# Patient Record
Sex: FEMALE | Race: WHITE | NOT HISPANIC OR LATINO | Employment: FULL TIME | URBAN - METROPOLITAN AREA
[De-identification: names, ages, dates, MRNs, and addresses within clinical notes are randomized per-mention and may not be internally consistent; named-entity substitution may affect disease eponyms.]

---

## 2019-07-02 ENCOUNTER — OFFICE VISIT (OUTPATIENT)
Dept: URGENT CARE | Facility: URGENT CARE | Age: 54
End: 2019-07-02
Payer: COMMERCIAL

## 2019-07-02 VITALS
RESPIRATION RATE: 20 BRPM | WEIGHT: 100 LBS | HEART RATE: 96 BPM | BODY MASS INDEX: 19.63 KG/M2 | OXYGEN SATURATION: 100 % | SYSTOLIC BLOOD PRESSURE: 162 MMHG | HEIGHT: 60 IN | DIASTOLIC BLOOD PRESSURE: 80 MMHG

## 2019-07-02 DIAGNOSIS — M54.50 ACUTE RIGHT-SIDED LOW BACK PAIN WITHOUT SCIATICA: Primary | ICD-10-CM

## 2019-07-02 PROCEDURE — 99203 OFFICE O/P NEW LOW 30 MIN: CPT | Performed by: FAMILY MEDICINE

## 2019-07-02 RX ORDER — METHOCARBAMOL 750 MG/1
750 TABLET, FILM COATED ORAL 4 TIMES DAILY PRN
Qty: 28 TABLET | Refills: 0 | Status: SHIPPED | OUTPATIENT
Start: 2019-07-02 | End: 2019-07-09

## 2019-07-02 RX ORDER — HYDROCODONE BITARTRATE AND ACETAMINOPHEN 5; 325 MG/1; MG/1
1 TABLET ORAL EVERY 6 HOURS PRN
Qty: 8 TABLET | Refills: 0 | Status: SHIPPED | OUTPATIENT
Start: 2019-07-02 | End: 2019-07-04

## 2019-07-02 RX ORDER — NAPROXEN 500 MG/1
500 TABLET ORAL 2 TIMES DAILY WITH MEALS
Qty: 14 TABLET | Refills: 0 | Status: SHIPPED | OUTPATIENT
Start: 2019-07-02 | End: 2019-07-09

## 2019-07-02 ASSESSMENT — MIFFLIN-ST. JEOR: SCORE: 980.1

## 2019-07-02 NOTE — LETTER
[place] [facility]  [address] [address]  [phone]      July 2, 2019    RE:  Sadie Ricci                                                                                                                                                       [address] [facility] [address]            To whom it may concern:    Sadie Ricci is under my professional care for Medical.   She  may return to work with the following on 7-4/5-19: Light duty-unable to lift more than 10 pounds rest of week.          Sincerely,        Jose Rafael Weston    [place] [facility]

## 2019-07-02 NOTE — PROGRESS NOTES
SUBJECTIVE:  Chief Complaint   Patient presents with     Back Pain     Pt states she was moving furniture yesterday afternoon and hurt her back. Lower R back is in the most pain   .yuliat presents with a chief complaint of right back.  The injury occurred 1 day ago.   The injury happened while at home.   How: lifting immediate pain  The patient complained of moderate pain and has had decreased ROM.    Pain exacerbated by movement    He treated it initially with no therapy.   This is the first time this type of injury has occurred to this patient.     No past medical history on file.  Allergies   Allergen Reactions     Wellbutrin [Bupropion] Hives     Social History     Tobacco Use     Smoking status: Current Every Day Smoker     Packs/day: 1.00     Smokeless tobacco: Never Used   Substance Use Topics     Alcohol use: Yes       ROSINTEGUMENTARY/SKIN: NEGATIVE for open wound/bleeding and NEGATIVE for bruising    EXAM: /80 (BP Location: Left arm, Patient Position: Sitting, Cuff Size: Adult Regular)   Pulse 96   Resp 20   Ht 1.524 m (5')   Wt 45.4 kg (100 lb)   SpO2 100%   BMI 19.53 kg/m  Gen: healthy,alert,no distress  Extremity: back has pain with palpation and rom.   There is not compromise to the distal circulation.  Pulses are +2 and CRT is brisk.GENERAL APPEARANCE: healthy, alert and no distress  EXTREMITIES: peripheral pulses normal  SKIN: no suspicious lesions or rashes  NEURO: Normal strength and tone, sensory exam grossly normal, mentation intact and speech normal  Negative SLR    X-RAY was not done.      ICD-10-CM    1. Acute right-sided low back pain without sciatica M54.5 methocarbamol (ROBAXIN) 750 MG tablet     naproxen (NAPROSYN) 500 MG tablet     HYDROcodone-acetaminophen (NORCO) 5-325 MG tablet     RICE

## 2020-01-01 ENCOUNTER — VIRTUAL VISIT (OUTPATIENT)
Dept: FAMILY MEDICINE | Facility: OTHER | Age: 55
End: 2020-01-01

## 2020-01-01 NOTE — PROGRESS NOTES
"Date: 2020 08:30:11  Clinician: Asha Song  Clinician NPI: [id number]  Patient: margaret ayers  Patient : 1965  Patient Address: [address] [place] [address]  Patient Phone: [phone]  Visit Protocol: UTI  Patient Summary:  margaret is a 54 year old ( : 1965 ) female who initiated a Visit for a presumed bladder infection. When asked the question \"Please sign me up to receive news, health information and promotions from [employer].\", margaret responded \"No\".   Her symptoms started 1-3 days ago and consist of dysuria, urinary frequency, and urgency.   Symptom details   Urine color: The color of her urine is yellow.    Denied symptoms include foul-smelling urine, nausea, feeling as if the bladder is never empty, flank pain, abdominal pain, chills, vaginal discharge, urinary incontinence, vomiting, and vaginal itching. She does not feel feverish.   Over-the-counter medications or home remedies used to relieve the current symptoms as reported by the patient (free text): Cranberry juice, drinking water and heating pad.   Precipitating events  margaret denies having a sexually transmitted disease.  Pertinent medical history  margaret has had a bladder infection before and has had 1 in the past 12 months. Her most recent bladder infection was not within the last 4 weeks. Her current symptoms are similar to her previous bladder infection symptoms.   Nitrofurantoin (Macrobid) has been effective in treating her past bladder infections.   She has experienced problems or side effects with the following antibiotics in the past: nitrofurantoin (Macrobid).  Problems or side effects as reported by the patient (free text): Stomach upset   margaret has not been prescribed antibiotics to prevent frequent or repeated bladder infections in the past and does not get yeast infections when she takes antibiotics.   margaret does not have a history of kidney stones. She has not used a catheter or been a patient in a hospital " or nursing home in the past 2 weeks.   margaret smokes or uses smokeless tobacco.     MEDICATIONS: No current medications, ALLERGIES: NKDA  Clinician Response:  Dear mragaret,  Based on the information you have provided, you likely have an acute urinary tract infection, also called a bladder infection. Bladder infections occur when bacteria from the outside of the body enters the urinary tract. Any part of the urinary system can be infected, but the bladder is the most common.  Medication information  I am prescribing:     Sulfamethoxazole-trimethoprim (Bactrim DS) 800-160 mg oral tablet. Take 1 tablet by mouth every 12 hours for 3 days. There are no refills with this prescription.   Certain antibiotics such as Bactrim and Ciprofloxacin can cause your skin to be more sensitive to the sun. Exposure to the sun when taking these antibiotics may cause skin rash, itching, redness, or a severe sunburn. Be sure to avoid prolonged or direct exposure to the sun, especially between 10 am and 3 pm, if possible. Wear protective clothing and use sunscreen of SPF 30 or higher when you are outdoors.  The medication I prescribed for your bladder infection is an antibiotic. Continue taking the medication until it is gone even if you feel better. If you get an upset stomach while taking antibiotics, taking the medication with food can help.   Yeast infections can be a common side effect of antibiotics. The most common symptom of a yeast infection is itchiness in and around the vagina. Other signs and symptoms include burning, redness, or a thick, white vaginal discharge that looks like cottage cheese and does not have a bad smell.  Self care  Urination helps to flush bacteria from the urinary tract. For this reason, drinking water and urinating often helps relieve some urinary symptoms and can decrease your risk of getting bladder infections in the future.  Other steps you can take to prevent future bladder infections include:     Wipe front  to back after using the bathroom    Urinate after sexual intercourse    Avoid using deodorant sprays, douches, or powders in the vaginal area     Becoming tobacco-free is one of the best things you can do to improve your health. For help with quitting tobacco, you can call 3-975-QUIT-NOW (1-240.579.2262) or visit smokefree.gov.  When to seek care  Please make an appointment to be seen in a clinic or urgent care if any of the following occur:     You develop new symptoms or your symptoms become worse    You have medication side effects that make it difficult to take them as prescribed    Your symptoms do not improve within 1-2 days of starting treatment    You have symptoms of a bladder infection that return shortly after completing treatment     It is possible to have an allergic reaction to an antibiotic even if you have not had one in the past. If you notice a new rash, significant swelling, or difficulty breathing, stop taking this medication immediately and go to a clinic or urgent care.   Diagnosis: Acute uncomplicated bladder infection  Diagnosis ICD: N39.0  Prescription: sulfamethoxazole-trimethoprim (Bactrim DS) 800-160 mg oral tablet 6 tablet, 3 days supply. Take 1 tablet by mouth every 12 hours for 3 days. Refills: 0, Refill as needed: no, Allow substitutions: yes  Pharmacy: [facility] #[id number] - [phone] - [address] [place] [address]

## 2020-04-28 ENCOUNTER — VIRTUAL VISIT (OUTPATIENT)
Dept: FAMILY MEDICINE | Facility: OTHER | Age: 55
End: 2020-04-28

## 2020-04-28 NOTE — PROGRESS NOTES
"Date: 2020 13:39:18  Clinician: Lani Clifford  Clinician NPI: [id number]  Patient: margaret ayers  Patient : 1965  Patient Address: [address] [place] [address]  Patient Phone: [phone]  Visit Protocol: UTI  Patient Summary:  margaret is a 54 year old ( : 1965 ) female who initiated a Visit for a presumed bladder infection. When asked the question \"Please sign me up to receive news, health information and promotions from [employer].\", margaret responded \"No\".   Her symptoms started 1-3 days ago and consist of dysuria, feeling as if the bladder is never empty, urinary frequency, and urgency.   Symptom details   Urine color: The color of her urine is yellow.    Denied symptoms include foul-smelling urine, nausea, flank pain, abdominal pain, chills, vaginal discharge, urinary incontinence, vomiting, and vaginal itching. She does not feel feverish.   Over-the-counter medications or home remedies used to relieve the current symptoms as reported by the patient (free text): cranberry juice, lots of water   Precipitating events  margaret denies having a sexually transmitted disease.  Pertinent medical history  margaret has had a bladder infection before and has had 1 in the past 12 months. Her most recent bladder infection was not within the last 4 weeks. Her current symptoms are similar to her previous bladder infection symptoms.   Sulfamethoxazole-trimethoprim (Bactrim DS) has been effective in treating her past bladder infections.   She has experienced problems or side effects with the following antibiotics in the past: nitrofurantoin (Macrobid).  Problems or side effects as reported by the patient (free text): stomach upset   margaret has not been prescribed antibiotics to prevent frequent or repeated bladder infections in the past and does not get yeast infections when she takes antibiotics.   margaret does not have a history of kidney stones. She has not used a catheter or been a patient in a hospital " or nursing home in the past 2 weeks.   margaret smokes or uses smokeless tobacco.     MEDICATIONS: No current medications, ALLERGIES: Wellbutrin  Clinician Response:  Dear margaret,    I agree, you probably do have a simple bladder infection. It's been long enough since your last round of antibiotics that we can use bactrim again for you, particularly since macrobid makes you nauseous. I'll send a prescription in for 3 days of bactrim. If you develop fever, new pain in your mid-back, and severe nausea/vomiting, those are signs that you might have a more serious infection and require different antibiotics. If your symptoms don't improve, or you develop those symptoms I just listed, please call back at [phone] to schedule an [facility] or virtual Urgent Care visit for further evaluation.    Diagnosis: Acute cystitis without hematuria  Diagnosis ICD: N30.00  Prescription: sulfamethoxazole-trimethoprim (Bactrim DS) 800-160 mg oral tablet 6 tablet, 3 days supply. Take 1 tablet by mouth every 12 hours for 3 days. Refills: 0, Refill as needed: no, Allow substitutions: yes  [id number] - [phone] - [address] [place] [address]  Addendum created: April 28 14:03:36, 2020 created by: Jose Rafael Weston body: I reviewed the e-visit and discussed the patient with the resident and agree with the resident's assessment and plan of care as documented.

## 2021-11-16 ENCOUNTER — HOSPITAL ENCOUNTER (EMERGENCY)
Facility: CLINIC | Age: 56
Discharge: HOME | End: 2021-11-16
Attending: EMERGENCY MEDICINE | Admitting: EMERGENCY MEDICINE
Payer: COMMERCIAL

## 2021-11-16 ENCOUNTER — APPOINTMENT (OUTPATIENT)
Dept: GENERAL RADIOLOGY | Facility: CLINIC | Age: 56
End: 2021-11-16
Attending: EMERGENCY MEDICINE
Payer: COMMERCIAL

## 2021-11-16 VITALS
RESPIRATION RATE: 24 BRPM | HEART RATE: 91 BPM | WEIGHT: 100 LBS | HEIGHT: 61 IN | BODY MASS INDEX: 18.88 KG/M2 | TEMPERATURE: 98.6 F | DIASTOLIC BLOOD PRESSURE: 89 MMHG | SYSTOLIC BLOOD PRESSURE: 152 MMHG | OXYGEN SATURATION: 96 %

## 2021-11-16 DIAGNOSIS — R07.9 CHEST PAIN, UNSPECIFIED TYPE: ICD-10-CM

## 2021-11-16 LAB
ANION GAP SERPL CALCULATED.3IONS-SCNC: 11 MMOL/L (ref 3–14)
BASOPHILS # BLD AUTO: 0 10E3/UL (ref 0–0.2)
BASOPHILS NFR BLD AUTO: 0 %
BUN SERPL-MCNC: 12 MG/DL (ref 7–30)
CALCIUM SERPL-MCNC: 9.3 MG/DL (ref 8.5–10.1)
CHLORIDE BLD-SCNC: 105 MMOL/L (ref 94–109)
CO2 SERPL-SCNC: 23 MMOL/L (ref 20–32)
CREAT SERPL-MCNC: 0.67 MG/DL (ref 0.52–1.04)
D DIMER PPP FEU-MCNC: 0.56 UG/ML FEU (ref 0–0.5)
EOSINOPHIL # BLD AUTO: 0.1 10E3/UL (ref 0–0.7)
EOSINOPHIL NFR BLD AUTO: 0 %
ERYTHROCYTE [DISTWIDTH] IN BLOOD BY AUTOMATED COUNT: 12.6 % (ref 10–15)
GFR SERPL CREATININE-BSD FRML MDRD: >90 ML/MIN/1.73M2
GLUCOSE BLD-MCNC: 151 MG/DL (ref 70–99)
HCT VFR BLD AUTO: 49.5 % (ref 35–47)
HGB BLD-MCNC: 16.4 G/DL (ref 11.7–15.7)
IMM GRANULOCYTES # BLD: 0.1 10E3/UL
IMM GRANULOCYTES NFR BLD: 1 %
LYMPHOCYTES # BLD AUTO: 1.4 10E3/UL (ref 0.8–5.3)
LYMPHOCYTES NFR BLD AUTO: 9 %
MCH RBC QN AUTO: 30.5 PG (ref 26.5–33)
MCHC RBC AUTO-ENTMCNC: 33.1 G/DL (ref 31.5–36.5)
MCV RBC AUTO: 92 FL (ref 78–100)
MONOCYTES # BLD AUTO: 0.8 10E3/UL (ref 0–1.3)
MONOCYTES NFR BLD AUTO: 5 %
NEUTROPHILS # BLD AUTO: 13.5 10E3/UL (ref 1.6–8.3)
NEUTROPHILS NFR BLD AUTO: 85 %
NRBC # BLD AUTO: 0 10E3/UL
NRBC BLD AUTO-RTO: 0 /100
PLATELET # BLD AUTO: 245 10E3/UL (ref 150–450)
POTASSIUM BLD-SCNC: 3.1 MMOL/L (ref 3.4–5.3)
RBC # BLD AUTO: 5.38 10E6/UL (ref 3.8–5.2)
SODIUM SERPL-SCNC: 139 MMOL/L (ref 133–144)
TROPONIN I SERPL-MCNC: <0.015 UG/L (ref 0–0.04)
TROPONIN I SERPL-MCNC: <0.015 UG/L (ref 0–0.04)
WBC # BLD AUTO: 15.9 10E3/UL (ref 4–11)

## 2021-11-16 PROCEDURE — 93005 ELECTROCARDIOGRAM TRACING: CPT

## 2021-11-16 PROCEDURE — 85025 COMPLETE CBC W/AUTO DIFF WBC: CPT | Performed by: EMERGENCY MEDICINE

## 2021-11-16 PROCEDURE — 84484 ASSAY OF TROPONIN QUANT: CPT | Performed by: EMERGENCY MEDICINE

## 2021-11-16 PROCEDURE — 71046 X-RAY EXAM CHEST 2 VIEWS: CPT

## 2021-11-16 PROCEDURE — 80048 BASIC METABOLIC PNL TOTAL CA: CPT | Performed by: EMERGENCY MEDICINE

## 2021-11-16 PROCEDURE — 85379 FIBRIN DEGRADATION QUANT: CPT | Performed by: EMERGENCY MEDICINE

## 2021-11-16 PROCEDURE — 36415 COLL VENOUS BLD VENIPUNCTURE: CPT | Performed by: EMERGENCY MEDICINE

## 2021-11-16 PROCEDURE — 250N000013 HC RX MED GY IP 250 OP 250 PS 637: Performed by: EMERGENCY MEDICINE

## 2021-11-16 PROCEDURE — 99285 EMERGENCY DEPT VISIT HI MDM: CPT | Mod: 25

## 2021-11-16 RX ORDER — LORAZEPAM 0.5 MG/1
1 TABLET ORAL ONCE
Status: COMPLETED | OUTPATIENT
Start: 2021-11-16 | End: 2021-11-16

## 2021-11-16 RX ADMIN — LORAZEPAM 1 MG: 0.5 TABLET ORAL at 08:51

## 2021-11-16 ASSESSMENT — MIFFLIN-ST. JEOR: SCORE: 978.04

## 2021-11-16 ASSESSMENT — ENCOUNTER SYMPTOMS
SHORTNESS OF BREATH: 1
PALPITATIONS: 1

## 2021-11-16 NOTE — ED PROVIDER NOTES
"  History   Chief Complaint:  Chest Pain and Anxiety       HPI   Sadie Ricci is a 56 year old female with history of low blood pressure who presents with chest pain and palpitations. The patient reports that she has had some recent stress. Yesterday she says she started feeling a pressure-like chest pain and like her heart was beating out of her chest. She says she is nervous about these symptoms, and this anxiety makes it worse. She also endorses some mild shortness of breath.     Review of Systems   Respiratory: Positive for shortness of breath.    Cardiovascular: Positive for chest pain and palpitations.   All other systems reviewed and are negative.        Allergies:  Wellbutrin    Medications:  The patient is not currently taking any medications.    Past Medical History:     Primary osteoarthritis of both first carpometacarpal joints      Past Surgical History:    The patient denies past surgical history.      Family History:    Diabetes - father, mother  Breast cancer - mother  Hyperlipidemia - sister    Social History:  The patient presents with a family member. She is a current everyday smoker.    Physical Exam     Patient Vitals for the past 24 hrs:   BP Temp Temp src Pulse Resp SpO2 Height Weight   11/16/21 1030 (!) 151/93 -- -- 87 23 95 % -- --   11/16/21 0930 (!) 168/91 -- -- 89 11 98 % -- --   11/16/21 0900 (!) 171/99 -- -- 80 17 98 % -- --   11/16/21 0840 (!) 189/100 98.6  F (37  C) Oral 89 17 98 % 1.537 m (5' 0.5\") 45.4 kg (100 lb)       Physical Exam  General/Appearance: appears stated age, well-groomed, appears mildly anxious  Eyes: EOMI, no scleral injection, no icterus  ENT: MMM  Neck: supple, nl ROM, no stiffness  Cardiovascular: RRR, nl S1S2, no m/r/g, 2+ pulses in all 4 extremities, cap refill <2sec  Respiratory: CTAB, good air movement throughout, no wheezes/rhonchi/rales, no increased WOB, no retractions  GI: abd soft, non-distended, nttp,  no HSM, no rebound, no guarding, nl BS  MSK: ESTEFANIA, " good tone, no bony abnormality  Skin: warm and well-perfused, no rash, no edema, no ecchymosis, nl turgor  Neuro: GCS 15, alert and oriented, no gross focal neuro deficits  Psych: interacts appropriately  Heme: no petechia, no purpura, no active bleeding    Emergency Department Course     ECG  ECG taken at 0837, ECG read at 0845  Normal sinus rhythm  Right atrial enlargement  Left axis deviation  Pulmonary disease pattern  Abnormal ECG  Rate 92 bpm. HI interval 148 ms. QRS duration 90 ms. QT/QTc 358/442 ms. P-R-T axes 80 -37 71.     Imaging:  XR Chest 2 Views:  No acute cardiopulmonary disease.  Report per radiology    Laboratory:  CBC: WBC 15.9 (H), HGB 16.4 (H),      BMP: Potassium 3.1 (L), Glucose 151 (H), o/w WNL (Creatinine 0.67)     D Dimer (Collected 0847): 0.56 (H)     Troponin (Collected 0847): <0.015    Procedures  None    Emergency Department Course:  Reviewed:  I reviewed nursing notes, vitals, past medical history and Care Everywhere    Assessments:  0840 I obtained history and examined the patient as noted above.   0925 I rechecked the patient and explained findings.   1009 I rechecked the patient. She says her pain has improved.  1148 I rechecked the patient.    Interventions:  0851 Ativan, 1 mg, PO    Disposition:  The patient was discharged to home.     Impression & Plan     Medical Decision Making:  This pt presents with chest pain of unclear etiology.  At this time I do not suspect that there is an acute/dangerous pathology for the chest pain.  They have no significant personal/family history of cardiac ds.  They have no significant cardiac risk factors.  Their EKG and CXR were unremarkable.  Their delta trop was neg.  I have also considered PE but they are PERC neg. There are no focal infiltrates, effusions, pulm edema, or evidence of PTX on CXR. The pt has not had recent fevers or viral infections to suggest pericarditis.  They are at low risk for aortic pathology and have nl aortic  contour on CXR.  They have not had recent chest trauma.  All of this was discussed with the pt and they were reassurred.  I have advised them to follow-up with their PCP in the next 3 days to get further evaluation, and to return to the ED sooner if their CP continues/worsens, they develop severe SOB/fevers/lightheadedness, or they develop any other new and concerning sxs. At this point the pt is HD stable and appropriate for discharge.        Diagnosis:    ICD-10-CM    1. Chest pain, unspecified type  R07.9        Discharge Medications:  New Prescriptions    No medications on file       Scribe Disclosure:  Bina MENDOZA, am serving as a scribe at 8:32 AM on 11/16/2021 to document services personally performed by Jennifer De La Garza MD based on my observations and the provider's statements to me.          Jennifer De La Garza MD  11/16/21 [id number]

## 2021-11-18 LAB
ATRIAL RATE - MUSE: 92 BPM
DIASTOLIC BLOOD PRESSURE - MUSE: NORMAL MMHG
INTERPRETATION ECG - MUSE: NORMAL
P AXIS - MUSE: 80 DEGREES
PR INTERVAL - MUSE: 148 MS
QRS DURATION - MUSE: 90 MS
QT - MUSE: 358 MS
QTC - MUSE: 442 MS
R AXIS - MUSE: -37 DEGREES
SYSTOLIC BLOOD PRESSURE - MUSE: NORMAL MMHG
T AXIS - MUSE: 71 DEGREES
VENTRICULAR RATE- MUSE: 92 BPM

## 2023-12-07 ENCOUNTER — HOSPITAL ENCOUNTER (EMERGENCY)
Facility: CLINIC | Age: 58
Discharge: HOME | End: 2023-12-07
Attending: EMERGENCY MEDICINE | Admitting: EMERGENCY MEDICINE
Payer: COMMERCIAL

## 2023-12-07 VITALS
WEIGHT: 100 LBS | HEART RATE: 93 BPM | HEIGHT: 60 IN | TEMPERATURE: 98 F | OXYGEN SATURATION: 98 % | RESPIRATION RATE: 18 BRPM | DIASTOLIC BLOOD PRESSURE: 92 MMHG | BODY MASS INDEX: 19.63 KG/M2 | SYSTOLIC BLOOD PRESSURE: 163 MMHG

## 2023-12-07 DIAGNOSIS — R00.2 PALPITATIONS: ICD-10-CM

## 2023-12-07 DIAGNOSIS — F41.9 ANXIETY: ICD-10-CM

## 2023-12-07 LAB
ALBUMIN SERPL BCG-MCNC: 5 G/DL (ref 3.5–5.2)
ALP SERPL-CCNC: 80 U/L (ref 40–150)
ALT SERPL W P-5'-P-CCNC: 12 U/L (ref 0–50)
ANION GAP SERPL CALCULATED.3IONS-SCNC: 17 MMOL/L (ref 7–15)
AST SERPL W P-5'-P-CCNC: 19 U/L (ref 0–45)
ATRIAL RATE - MUSE: 110 BPM
BASOPHILS # BLD AUTO: 0 10E3/UL (ref 0–0.2)
BASOPHILS NFR BLD AUTO: 0 %
BILIRUB SERPL-MCNC: 0.8 MG/DL
BUN SERPL-MCNC: 6.6 MG/DL (ref 6–20)
CALCIUM SERPL-MCNC: 10.1 MG/DL (ref 8.6–10)
CHLORIDE SERPL-SCNC: 98 MMOL/L (ref 98–107)
CREAT SERPL-MCNC: 0.6 MG/DL (ref 0.51–0.95)
DEPRECATED HCO3 PLAS-SCNC: 23 MMOL/L (ref 22–29)
DIASTOLIC BLOOD PRESSURE - MUSE: NORMAL MMHG
EGFRCR SERPLBLD CKD-EPI 2021: >90 ML/MIN/1.73M2
EOSINOPHIL # BLD AUTO: 0 10E3/UL (ref 0–0.7)
EOSINOPHIL NFR BLD AUTO: 0 %
ERYTHROCYTE [DISTWIDTH] IN BLOOD BY AUTOMATED COUNT: 13 % (ref 10–15)
GLUCOSE SERPL-MCNC: 156 MG/DL (ref 70–99)
HCT VFR BLD AUTO: 51.9 % (ref 35–47)
HGB BLD-MCNC: 17.5 G/DL (ref 11.7–15.7)
HOLD SPECIMEN: NORMAL
IMM GRANULOCYTES # BLD: 0.1 10E3/UL
IMM GRANULOCYTES NFR BLD: 1 %
INTERPRETATION ECG - MUSE: NORMAL
LIPASE SERPL-CCNC: 35 U/L (ref 13–60)
LYMPHOCYTES # BLD AUTO: 1.7 10E3/UL (ref 0.8–5.3)
LYMPHOCYTES NFR BLD AUTO: 13 %
MCH RBC QN AUTO: 31.5 PG (ref 26.5–33)
MCHC RBC AUTO-ENTMCNC: 33.7 G/DL (ref 31.5–36.5)
MCV RBC AUTO: 93 FL (ref 78–100)
MONOCYTES # BLD AUTO: 0.8 10E3/UL (ref 0–1.3)
MONOCYTES NFR BLD AUTO: 6 %
NEUTROPHILS # BLD AUTO: 10.2 10E3/UL (ref 1.6–8.3)
NEUTROPHILS NFR BLD AUTO: 80 %
NRBC # BLD AUTO: 0 10E3/UL
NRBC BLD AUTO-RTO: 0 /100
P AXIS - MUSE: 75 DEGREES
PLATELET # BLD AUTO: 223 10E3/UL (ref 150–450)
POTASSIUM SERPL-SCNC: 3.8 MMOL/L (ref 3.4–5.3)
PR INTERVAL - MUSE: 186 MS
PROT SERPL-MCNC: 7.8 G/DL (ref 6.4–8.3)
QRS DURATION - MUSE: 72 MS
QT - MUSE: 332 MS
QTC - MUSE: 449 MS
R AXIS - MUSE: 5 DEGREES
RBC # BLD AUTO: 5.56 10E6/UL (ref 3.8–5.2)
SODIUM SERPL-SCNC: 138 MMOL/L (ref 135–145)
SYSTOLIC BLOOD PRESSURE - MUSE: NORMAL MMHG
T AXIS - MUSE: 79 DEGREES
TROPONIN T SERPL HS-MCNC: 7 NG/L
TSH SERPL DL<=0.005 MIU/L-ACNC: 0.67 UIU/ML (ref 0.3–4.2)
VENTRICULAR RATE- MUSE: 110 BPM
WBC # BLD AUTO: 12.9 10E3/UL (ref 4–11)

## 2023-12-07 PROCEDURE — 84484 ASSAY OF TROPONIN QUANT: CPT | Performed by: EMERGENCY MEDICINE

## 2023-12-07 PROCEDURE — 36415 COLL VENOUS BLD VENIPUNCTURE: CPT | Performed by: EMERGENCY MEDICINE

## 2023-12-07 PROCEDURE — 85025 COMPLETE CBC W/AUTO DIFF WBC: CPT | Performed by: EMERGENCY MEDICINE

## 2023-12-07 PROCEDURE — 99284 EMERGENCY DEPT VISIT MOD MDM: CPT

## 2023-12-07 PROCEDURE — 80053 COMPREHEN METABOLIC PANEL: CPT | Performed by: EMERGENCY MEDICINE

## 2023-12-07 PROCEDURE — 83690 ASSAY OF LIPASE: CPT | Performed by: EMERGENCY MEDICINE

## 2023-12-07 PROCEDURE — 84443 ASSAY THYROID STIM HORMONE: CPT | Performed by: EMERGENCY MEDICINE

## 2023-12-07 PROCEDURE — 93005 ELECTROCARDIOGRAM TRACING: CPT

## 2023-12-07 RX ORDER — LORAZEPAM 0.5 MG/1
0.5 TABLET ORAL EVERY 4 HOURS PRN
Qty: 20 TABLET | Refills: 0 | Status: SHIPPED | OUTPATIENT
Start: 2023-12-07

## 2023-12-07 RX ORDER — ESCITALOPRAM OXALATE 5 MG/1
5 TABLET ORAL DAILY
Qty: 30 TABLET | Refills: 0 | Status: SHIPPED | OUTPATIENT
Start: 2023-12-07

## 2023-12-07 ASSESSMENT — ACTIVITIES OF DAILY LIVING (ADL): ADLS_ACUITY_SCORE: 35

## 2023-12-07 NOTE — ED PROVIDER NOTES
History     Chief Complaint:  Palpitations and Anxiety    The history is provided by the patient.     Sadie Ricci is a 58 year old female with history of anxiety presenting for evaluation of palpitations and anxiety. Sadie reports palpitations for the last few days, accompanied by intermittent episodes of nonexertional chest and shoulder tightness lasting less than an hour. She reports a history of anxiety, noting previous use of Lexapro but no anxiety medication for the last year and a half. She notes use of Ativan (0.5 mg) yesterday, which she states was effective. She denies self harm, homicidal ideation, or auditory or visual hallucinations. She denies recent weight gain or loss. She denies a cardiac history. She denies a history of diabetes, hypertension, or hyperlipidemia. She denies a history of surgeries or hospitalizations. She is current smoker. She reports recent stressors including caring for a sick parent. 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    Independent Historian:   None - Patient Only    Review of External Notes:    None.    Medications:    Lexapro  Ativan  Levsin    Past Medical History:    Anxiety  Primary osteoarthritis of both first carpometacarpal joints    Physical Exam   Patient Vitals for the past 24 hrs:   BP Temp Temp src Pulse Resp SpO2 Height Weight   12/07/23 1203 -- -- -- 93 18 98 % -- --   12/07/23 1202 -- -- -- 90 16 97 % -- --   12/07/23 1200 (!) 163/92 -- -- 85 14 96 % -- --   12/07/23 1101 -- -- -- 94 18 99 % -- --   12/07/23 1100 (!) 163/93 -- -- 94 14 99 % -- --   12/07/23 1038 (!) 194/110 98  F (36.7  C) Oral -- -- -- 1.524 m (5') 45.4 kg (100 lb)   12/07/23 1036 -- -- -- 109 17 100 % -- --     Physical Exam  Constitutional: Middle-aged white woman. Sitting. No respiratory distress.  HENT: No signs of trauma.   Eyes: EOM are normal. Pupils are equal, round, and reactive to light.   Neck: Normal range of motion. No JVD present. No cervical  adenopathy.  Cardiovascular: Regular rhythm.  Exam reveals no gallop and no friction rub.    No murmur heard.  Pulmonary/Chest: Bilateral breath sounds normal. No wheezes, rhonchi or rales.  Abdominal: Soft. No tenderness. No rebound or guarding.   Musculoskeletal: No edema. No tenderness.   Lymphadenopathy: No lymphadenopathy.   Neurological: Alert and oriented to person, place, and time. Normal strength. Coordination normal. Knee reflexes 2+ bilaterally  Skin: Skin is warm and dry. No rash noted. No erythema.   Psych: Anxious. No overt psychosis    Emergency Department Course   ECG  ECG taken at 1034, ECG read at 1042  Sinus tachycardia  Biatrial enlargement   Rate 110 bpm. KY interval 186 ms. QRS duration 72 ms. QT/QTc 332/449 ms. P-R-T axes 75 5 79.      Laboratory:  Labs Ordered and Resulted from Time of ED Arrival to Time of ED Departure   COMPREHENSIVE METABOLIC PANEL - Abnormal       Result Value    Sodium 138      Potassium 3.8      Carbon Dioxide (CO2) 23      Anion Gap 17 (*)     Urea Nitrogen 6.6      Creatinine 0.60      GFR Estimate >90      Calcium 10.1 (*)     Chloride 98      Glucose 156 (*)     Alkaline Phosphatase 80      AST 19      ALT 12      Protein Total 7.8      Albumin 5.0      Bilirubin Total 0.8     CBC WITH PLATELETS AND DIFFERENTIAL - Abnormal    WBC Count 12.9 (*)     RBC Count 5.56 (*)     Hemoglobin 17.5 (*)     Hematocrit 51.9 (*)     MCV 93      MCH 31.5      MCHC 33.7      RDW 13.0      Platelet Count 223      % Neutrophils 80      % Lymphocytes 13      % Monocytes 6      % Eosinophils 0      % Basophils 0      % Immature Granulocytes 1      NRBCs per 100 WBC 0      Absolute Neutrophils 10.2 (*)     Absolute Lymphocytes 1.7      Absolute Monocytes 0.8      Absolute Eosinophils 0.0      Absolute Basophils 0.0      Absolute Immature Granulocytes 0.1      Absolute NRBCs 0.0     LIPASE - Normal    Lipase 35     TROPONIN T, HIGH SENSITIVITY - Normal    Troponin T, High Sensitivity 7      TSH WITH FREE T4 REFLEX - Normal    TSH 0.67       Emergency Department Course & Assessments:       Assessments:  1130 I obtained history and examined the patient as noted above.   1245 I discussed findings and discharge with the patient. All questions answered.     Independent Interpretation (X-rays, CTs, rhythm strip):  None    Consultations/Discussion of Management or Tests:  None        Social Determinants of Health affecting care:   Stress/Adjustment Disorders    Disposition:  The patient was discharged to home.     Impression & Plan    Medical Decision Making:  Sadie is a very anxious woman who presents with palpitations for the last several days. She feels like her stress has come back. A couple years ago she was on Lexapro and an occasional Ativan, which seemed to help her. She is no longer on this. She cannot tell me anything specific has brought this on, just stresses of life. Her exam is non-focal. She is, however, very anxious. Her labs and EKG are unremarkable. I will start her back on Lexapro and some PRN Ativan. I warned her not to drive with this. She will make a follow-up with her primary. I think this is much more likely anxiety than it is cardiac.    Diagnosis:    ICD-10-CM    1. Anxiety  F41.9       2. Palpitations  R00.2         Discharge Medications:  New Prescriptions    ESCITALOPRAM (LEXAPRO) 5 MG TABLET    Take 1 tablet (5 mg) by mouth daily    LORAZEPAM (ATIVAN) 0.5 MG TABLET    Take 1 tablet (0.5 mg) by mouth every 4 hours as needed for anxiety     Scribe Disclosure:  I, Rubén Harry, am serving as a scribe at 12:11 PM on 12/7/2023 to document services personally performed by Guanako Mcclure MD based on my observations and the provider's statements to me.   12/7/2023   Guanako Mcclure MD Steinman, Randall Ira, MD  12/07/23 7415

## 2023-12-07 NOTE — ED TRIAGE NOTES
Since Monday: constant palpitations and anxiety. Reports pain to both shoulder blades and now pain to chest. Reports similar symptoms 2 years ago when she was diagnosed with KAREL. Patient currently not taking any medications for anxiety. Patient reports increased stress recently.      Triage Assessment (Adult)       Row Name 12/07/23 1036          Triage Assessment    Airway WDL WDL        Respiratory WDL    Respiratory WDL WDL        Skin Circulation/Temperature WDL    Skin Circulation/Temperature WDL WDL        Cardiac WDL    Cardiac WDL X   pain to anterior chest and shoulder blades        Peripheral/Neurovascular WDL    Peripheral Neurovascular WDL WDL        Cognitive/Neuro/Behavioral WDL    Cognitive/Neuro/Behavioral WDL WDL

## 2024-12-03 ENCOUNTER — HOSPITAL ENCOUNTER (EMERGENCY)
Facility: CLINIC | Age: 59
Discharge: HOME | End: 2024-12-03
Attending: EMERGENCY MEDICINE | Admitting: EMERGENCY MEDICINE
Payer: COMMERCIAL

## 2024-12-03 ENCOUNTER — APPOINTMENT (OUTPATIENT)
Dept: CT IMAGING | Facility: CLINIC | Age: 59
End: 2024-12-03
Attending: EMERGENCY MEDICINE
Payer: COMMERCIAL

## 2024-12-03 VITALS
DIASTOLIC BLOOD PRESSURE: 89 MMHG | WEIGHT: 102 LBS | BODY MASS INDEX: 20.03 KG/M2 | SYSTOLIC BLOOD PRESSURE: 173 MMHG | HEIGHT: 60 IN | RESPIRATION RATE: 16 BRPM | TEMPERATURE: 97.1 F | HEART RATE: 92 BPM | OXYGEN SATURATION: 97 %

## 2024-12-03 DIAGNOSIS — N20.1 RIGHT URETERAL STONE: ICD-10-CM

## 2024-12-03 LAB
ALBUMIN SERPL BCG-MCNC: 4.6 G/DL (ref 3.5–5.2)
ALBUMIN UR-MCNC: 100 MG/DL
ALP SERPL-CCNC: 104 U/L (ref 40–150)
ALT SERPL W P-5'-P-CCNC: 15 U/L (ref 0–50)
ANION GAP SERPL CALCULATED.3IONS-SCNC: 18 MMOL/L (ref 7–15)
APPEARANCE UR: CLEAR
AST SERPL W P-5'-P-CCNC: 26 U/L (ref 0–45)
BASOPHILS # BLD AUTO: 0.1 10E3/UL (ref 0–0.2)
BASOPHILS NFR BLD AUTO: 0 %
BILIRUB SERPL-MCNC: 0.8 MG/DL
BILIRUB UR QL STRIP: NEGATIVE
BUN SERPL-MCNC: 13.3 MG/DL (ref 6–20)
CALCIUM SERPL-MCNC: 9.9 MG/DL (ref 8.8–10.4)
CHLORIDE SERPL-SCNC: 103 MMOL/L (ref 98–107)
COLOR UR AUTO: YELLOW
CREAT SERPL-MCNC: 0.79 MG/DL (ref 0.51–0.95)
EGFRCR SERPLBLD CKD-EPI 2021: 86 ML/MIN/1.73M2
EOSINOPHIL # BLD AUTO: 0.1 10E3/UL (ref 0–0.7)
EOSINOPHIL NFR BLD AUTO: 0 %
ERYTHROCYTE [DISTWIDTH] IN BLOOD BY AUTOMATED COUNT: 13.3 % (ref 10–15)
GLUCOSE SERPL-MCNC: 158 MG/DL (ref 70–99)
GLUCOSE UR STRIP-MCNC: NEGATIVE MG/DL
HCO3 SERPL-SCNC: 22 MMOL/L (ref 22–29)
HCT VFR BLD AUTO: 47.4 % (ref 35–47)
HGB BLD-MCNC: 16.1 G/DL (ref 11.7–15.7)
HGB UR QL STRIP: ABNORMAL
IMM GRANULOCYTES # BLD: 0.2 10E3/UL
IMM GRANULOCYTES NFR BLD: 1 %
KETONES UR STRIP-MCNC: ABNORMAL MG/DL
LEUKOCYTE ESTERASE UR QL STRIP: NEGATIVE
LIPASE SERPL-CCNC: 24 U/L (ref 13–60)
LYMPHOCYTES # BLD AUTO: 1.2 10E3/UL (ref 0.8–5.3)
LYMPHOCYTES NFR BLD AUTO: 5 %
MCH RBC QN AUTO: 31.3 PG (ref 26.5–33)
MCHC RBC AUTO-ENTMCNC: 34 G/DL (ref 31.5–36.5)
MCV RBC AUTO: 92 FL (ref 78–100)
MONOCYTES # BLD AUTO: 1.8 10E3/UL (ref 0–1.3)
MONOCYTES NFR BLD AUTO: 8 %
MUCOUS THREADS #/AREA URNS LPF: PRESENT /LPF
NEUTROPHILS # BLD AUTO: 20.4 10E3/UL (ref 1.6–8.3)
NEUTROPHILS NFR BLD AUTO: 86 %
NITRATE UR QL: NEGATIVE
NRBC # BLD AUTO: 0 10E3/UL
NRBC BLD AUTO-RTO: 0 /100
PH UR STRIP: 6.5 [PH] (ref 5–7)
PLAT MORPH BLD: NORMAL
PLATELET # BLD AUTO: 239 10E3/UL (ref 150–450)
POTASSIUM SERPL-SCNC: 3.7 MMOL/L (ref 3.4–5.3)
PROT SERPL-MCNC: 7.8 G/DL (ref 6.4–8.3)
RBC # BLD AUTO: 5.15 10E6/UL (ref 3.8–5.2)
RBC MORPH BLD: NORMAL
RBC URINE: 19 /HPF
SODIUM SERPL-SCNC: 143 MMOL/L (ref 135–145)
SP GR UR STRIP: 1.02 (ref 1–1.03)
SQUAMOUS EPITHELIAL: 2 /HPF
UROBILINOGEN UR STRIP-MCNC: NORMAL MG/DL
WBC # BLD AUTO: 23.7 10E3/UL (ref 4–11)
WBC URINE: 2 /HPF

## 2024-12-03 PROCEDURE — 81001 URINALYSIS AUTO W/SCOPE: CPT | Performed by: EMERGENCY MEDICINE

## 2024-12-03 PROCEDURE — 74176 CT ABD & PELVIS W/O CONTRAST: CPT

## 2024-12-03 PROCEDURE — 83690 ASSAY OF LIPASE: CPT | Performed by: EMERGENCY MEDICINE

## 2024-12-03 PROCEDURE — 85025 COMPLETE CBC W/AUTO DIFF WBC: CPT | Performed by: EMERGENCY MEDICINE

## 2024-12-03 PROCEDURE — 85004 AUTOMATED DIFF WBC COUNT: CPT | Performed by: EMERGENCY MEDICINE

## 2024-12-03 PROCEDURE — 84155 ASSAY OF PROTEIN SERUM: CPT | Performed by: EMERGENCY MEDICINE

## 2024-12-03 PROCEDURE — 99284 EMERGENCY DEPT VISIT MOD MDM: CPT | Mod: 25

## 2024-12-03 PROCEDURE — 80053 COMPREHEN METABOLIC PANEL: CPT | Performed by: EMERGENCY MEDICINE

## 2024-12-03 PROCEDURE — 82310 ASSAY OF CALCIUM: CPT | Performed by: EMERGENCY MEDICINE

## 2024-12-03 PROCEDURE — 36415 COLL VENOUS BLD VENIPUNCTURE: CPT | Performed by: EMERGENCY MEDICINE

## 2024-12-03 PROCEDURE — 84075 ASSAY ALKALINE PHOSPHATASE: CPT | Performed by: EMERGENCY MEDICINE

## 2024-12-03 PROCEDURE — 85049 AUTOMATED PLATELET COUNT: CPT | Performed by: EMERGENCY MEDICINE

## 2024-12-03 RX ORDER — KETOROLAC TROMETHAMINE 10 MG/1
10 TABLET, FILM COATED ORAL EVERY 6 HOURS PRN
Qty: 12 TABLET | Refills: 0 | Status: SHIPPED | OUTPATIENT
Start: 2024-12-03

## 2024-12-03 RX ORDER — ONDANSETRON 4 MG/1
4 TABLET, ORALLY DISINTEGRATING ORAL EVERY 8 HOURS PRN
Qty: 12 TABLET | Refills: 0 | Status: SHIPPED | OUTPATIENT
Start: 2024-12-03

## 2024-12-03 ASSESSMENT — ACTIVITIES OF DAILY LIVING (ADL)
ADLS_ACUITY_SCORE: 41

## 2024-12-03 ASSESSMENT — COLUMBIA-SUICIDE SEVERITY RATING SCALE - C-SSRS
2. HAVE YOU ACTUALLY HAD ANY THOUGHTS OF KILLING YOURSELF IN THE PAST MONTH?: NO
1. IN THE PAST MONTH, HAVE YOU WISHED YOU WERE DEAD OR WISHED YOU COULD GO TO SLEEP AND NOT WAKE UP?: NO
6. HAVE YOU EVER DONE ANYTHING, STARTED TO DO ANYTHING, OR PREPARED TO DO ANYTHING TO END YOUR LIFE?: NO

## 2024-12-03 NOTE — ED TRIAGE NOTES
Pt complains of right sided flank pain with dysuria that has been worsening over the last week.     Triage Assessment (Adult)       Row Name 12/03/24 1006          Triage Assessment    Airway WDL WDL        Respiratory WDL    Respiratory WDL WDL        Skin Circulation/Temperature WDL    Skin Circulation/Temperature WDL WDL        Cardiac WDL    Cardiac WDL WDL        Peripheral/Neurovascular WDL    Peripheral Neurovascular WDL WDL        Cognitive/Neuro/Behavioral WDL    Cognitive/Neuro/Behavioral WDL WDL

## 2024-12-03 NOTE — ED PROVIDER NOTES
Emergency Department Note      History of Present Illness     Chief Complaint   Flank Pain      HPI   Sadie Ricci is a 59 year old female  who presents to the ED for flank pain. The patient reports experiencing right sided flank pain for the past week. She notes that it did temporarily subsided, but since returning, it has been pretty constant. It does not wrap around to her front side at all. Her current pain is less intense than usual. She further reports urinary urgency, dysuria, nausea, and vomiting. She has taken ibuprofen and tylenol, though she notes this does not alleviate her pain. Patient denies any fever, diarrhea, or bruising. She further denies any injury to her back, history of similar symptoms, or previous abdominal surgeries. Patient is not on blood thinners, though she takes medication for her anxiety and BP.    Independent Historian   None    Review of External Notes   None    Past Medical History     Medical History and Problem List   No past medical history on file.    Medications   escitalopram (LEXAPRO) 5 MG tablet  ketorolac (TORADOL) 10 MG tablet  LORazepam (ATIVAN) 0.5 MG tablet  ondansetron (ZOFRAN ODT) 4 MG ODT tab        Surgical History   No past surgical history on file.    Physical Exam     Patient Vitals for the past 24 hrs:   BP Temp Temp src Pulse Resp SpO2 Height Weight   12/03/24 1001 (!) 173/89 97.1  F (36.2  C) Temporal 92 16 97 % 1.524 m (5') 46.3 kg (102 lb)     Physical Exam  Constitutional: Vital signs reviewed as above  General: Alert  HEENT: Moist mucous membranes  Eyes: Conjunctiva normal.   Neck: Normal range of motion  Cardiovascular: Regular rate, Regular rhythm and normal heart sounds.  No MRG  Pulmonary/Chest: Effort normal and breath sounds normal. No respiratory distress. Patient has no wheezes. Patient has no rales.   Abdominal: Soft. Positive bowel sounds. No MRG.  Musculoskeletal/Extremities: Full ROM. Mild right flank tenderness. Superpubic  tenderness.  Endo: No pitting edema  Neurological: Alert, no focal deficits.  Skin: Skin is warm and dry.   Psychiatric: Pleasant    Diagnostics     Lab Results   Labs Ordered and Resulted from Time of ED Arrival to Time of ED Departure   UA MACROSCOPIC WITH REFLEX TO MICRO AND CULTURE - Abnormal       Result Value    Color Urine Yellow      Appearance Urine Clear      Glucose Urine Negative      Bilirubin Urine Negative      Ketones Urine Trace (*)     Specific Gravity Urine 1.025      Blood Urine Small (*)     pH Urine 6.5      Protein Albumin Urine 100 (*)     Urobilinogen Urine Normal      Nitrite Urine Negative      Leukocyte Esterase Urine Negative      Mucus Urine Present (*)     RBC Urine 19 (*)     WBC Urine 2      Squamous Epithelials Urine 2 (*)    COMPREHENSIVE METABOLIC PANEL - Abnormal    Sodium 143      Potassium 3.7      Carbon Dioxide (CO2) 22      Anion Gap 18 (*)     Urea Nitrogen 13.3      Creatinine 0.79      GFR Estimate 86      Calcium 9.9      Chloride 103      Glucose 158 (*)     Alkaline Phosphatase 104      AST 26      ALT 15      Protein Total 7.8      Albumin 4.6      Bilirubin Total 0.8     CBC WITH PLATELETS AND DIFFERENTIAL - Abnormal    WBC Count 23.7 (*)     RBC Count 5.15      Hemoglobin 16.1 (*)     Hematocrit 47.4 (*)     MCV 92      MCH 31.3      MCHC 34.0      RDW 13.3      Platelet Count 239      % Neutrophils 86      % Lymphocytes 5      % Monocytes 8      % Eosinophils 0      % Basophils 0      % Immature Granulocytes 1      NRBCs per 100 WBC 0      Absolute Neutrophils 20.4 (*)     Absolute Lymphocytes 1.2      Absolute Monocytes 1.8 (*)     Absolute Eosinophils 0.1      Absolute Basophils 0.1      Absolute Immature Granulocytes 0.2      Absolute NRBCs 0.0     LIPASE - Normal    Lipase 24     RBC AND PLATELET MORPHOLOGY    RBC Morphology Confirmed RBC Indices      Platelet Assessment        Value: Automated Count Confirmed. Platelet morphology is normal.       Imaging    Abd/pelvis CT no contrast - Stone Protocol   Preliminary Result   IMPRESSION:    1.  Obstructing 0.4 cm stone at the right ureterovesical junction   causes mild right hydronephrosis.   2.  Small nonobstructing stones in both kidneys measures 0.2 cm each.          Independent Interpretation   None    ED Course      Medications Administered   Medications - No data to display    Procedures   Procedures     Discussion of Management   None    ED Course   ED Course as of 12/03/24 1230   Tue Dec 03, 2024   1129 I obtained history and examined the patient as noted above.     1214 I rechecked the patient and discussed discharge.       Additional Documentation  None    Medical Decision Making / Diagnosis     CMS Diagnoses: None    MIPS       None    [facility] [place]   Sadie Ricci is a 59 year old female who presents with right-sided flank pain which has been off and on for the past week.  She did have some nausea and vomiting last night.  She states that she has urgency to go.  She has not lost any blood in her urine.  No history abdominal surgeries.  Take ibuprofen and Tylenol.  Labs here do show leukocytosis of 23.7 which is likely secondary to her vomiting.  The rest of her PCP was sent unremarkable.  Comprehensive metabolic panel does show a glucose of 158.  Urine shows blood without infection.  Lipase was normal.  CT scan does show an obstructing 4 mm stone at the right ureterovesicular junction with mild right hydronephrosis.  There are also small amount of obstructing stones in both kidneys measuring up to 2 mm each. Patient was informed about these.  She declined anything for pain here.  I will discharge her home with Toradol and Zofran.  Follow-up as symptoms warrant.    Disposition   The patient was discharged.     Diagnosis     ICD-10-CM    1. Right ureteral stone  N20.1            Discharge Medications   New Prescriptions    KETOROLAC (TORADOL) 10 MG TABLET    Take 1 tablet (10 mg) by mouth every 6 hours as needed for  moderate pain.    ONDANSETRON (ZOFRAN ODT) 4 MG ODT TAB    Take 1 tablet (4 mg) by mouth every 8 hours as needed for nausea.         Scribe Disclosure:  I, Anna Thakkar, am serving as a scribe at 12:22 PM on 12/3/2024 to document services personally performed by Gunnar Chung MD based on my observations and the provider's statements to me.        Gunnar Chung MD  12/03/24 2190

## 2024-12-03 NOTE — Clinical Note
Sadie Ricci was seen and treated in our emergency department on 12/3/2024.  She may return to work on 12/05/2024.       If you have any questions or concerns, please don't hesitate to call.      Gunnar Chung MD